# Patient Record
Sex: FEMALE | Race: WHITE | Employment: FULL TIME | URBAN - METROPOLITAN AREA
[De-identification: names, ages, dates, MRNs, and addresses within clinical notes are randomized per-mention and may not be internally consistent; named-entity substitution may affect disease eponyms.]

---

## 2019-11-16 ENCOUNTER — HOSPITAL ENCOUNTER (OUTPATIENT)
Dept: MAMMOGRAPHY | Age: 54
Discharge: HOME | End: 2019-11-16
Payer: COMMERCIAL

## 2019-11-16 DIAGNOSIS — Z12.31 SCREENING MAMMOGRAM FOR HIGH-RISK PATIENT: ICD-10-CM

## 2019-11-16 PROCEDURE — 77067 SCR MAMMO BI INCL CAD: CPT

## 2021-08-19 ENCOUNTER — TRANSCRIBE ORDER (OUTPATIENT)
Dept: SCHEDULING | Age: 56
End: 2021-08-19

## 2021-08-19 DIAGNOSIS — Z12.31 SCREENING MAMMOGRAM FOR HIGH-RISK PATIENT: Primary | ICD-10-CM

## 2021-09-04 ENCOUNTER — HOSPITAL ENCOUNTER (OUTPATIENT)
Dept: MAMMOGRAPHY | Age: 56
Discharge: HOME | End: 2021-09-04
Payer: COMMERCIAL

## 2021-09-04 DIAGNOSIS — Z12.31 SCREENING MAMMOGRAM FOR HIGH-RISK PATIENT: ICD-10-CM

## 2021-09-04 PROCEDURE — 77067 SCR MAMMO BI INCL CAD: CPT

## 2022-11-09 ENCOUNTER — OFFICE VISIT (OUTPATIENT)
Dept: ORTHOPEDIC SURGERY | Age: 57
End: 2022-11-09
Payer: COMMERCIAL

## 2022-11-09 VITALS — WEIGHT: 225.8 LBS | HEIGHT: 64 IN | BODY MASS INDEX: 38.55 KG/M2

## 2022-11-09 DIAGNOSIS — Z96.651 PRESENCE OF TOTAL KNEE JOINT PROSTHESIS, RIGHT: ICD-10-CM

## 2022-11-09 DIAGNOSIS — M25.561 ACUTE PAIN OF RIGHT KNEE: Primary | ICD-10-CM

## 2022-11-09 DIAGNOSIS — Z96.651 PRESENCE OF TOTAL KNEE JOINT PROSTHESIS, RIGHT: Primary | ICD-10-CM

## 2022-11-09 DIAGNOSIS — Z09 SURGERY FOLLOW-UP: ICD-10-CM

## 2022-11-09 PROCEDURE — 99204 OFFICE O/P NEW MOD 45 MIN: CPT | Performed by: ORTHOPAEDIC SURGERY

## 2022-11-09 RX ORDER — DICLOFENAC SODIUM 75 MG/1
75 TABLET, DELAYED RELEASE ORAL 2 TIMES DAILY
Qty: 60 TABLET | Refills: 0 | Status: SHIPPED | OUTPATIENT
Start: 2022-11-09

## 2022-11-09 NOTE — PROGRESS NOTES
Name: Na Cason  YOB: 1965  Gender: female  MRN: [id number]    CC: Right knee pain status post total knee arthroplasty    HPI: Na Cason is a 59 y.o. female who presents with concerns with her right total knee arthroplasty done in 2015. Her surgery was done with a cemented tricompartmental [employer] attune implant. She has generally had a good outcome from her knee replacement surgery. She states she has had several falls over the years and her most memorable one was several years ago. She has been having some anterior discomfort and has noted some clicking over the past several months. She has not seen to be having weightbearing pain and her symptoms do not seem to accompany activity in a predictable way. She has been taking some occasional over-the-counter anti-inflammatory medications but not on a regular basis. She had been advised that if she starts having some discomfort of this nature to have the knee checked and for that reason she comes in for that purpose today. History was obtained from patient    ROS/Meds/PSH/PMH/FH/SH: I personally reviewed the patients standard intake form. Below are the pertinents    Tobacco:  reports that she has never smoked.  She has never used smokeless tobacco.  Past Medical History:   Diagnosis Date    Arthritis     Cancer ([facility] [address] 2011    basal cell on face    Cellulitis 07/2015    right leg    Diabetes ([facility] [address]     \" borderline \" / checks BS 1-2 times a day; average FBS 90's; no meds    Hypertension     controlled with med    Psoriasis     Psychiatric disorder     \" anxiety attacks occasionally \"     Thyroid disease     hypo, controlled with Levothyroxine      Past Surgical History:   Procedure Laterality Date    BUNIONECTOMY Left 1998    left foot surgery (bunion, heel spur)    BUNIONECTOMY Right [address]    COLONOSCOPY  07/2015    KNEE SURGERY Left 2003    KNEE SURGERY Right 2012 OTHER SURGICAL HISTORY  2011    excision of basal cell on face        Physical Examination:  Physical exam: On examination of the patient's gait there is no obvious limp    On examination while standing there is decreased flexion in the lumbosacral spine without acute list or spasm. While seated ,  the Bilateral hip is examined there is full range of motion without obvious issue . On examination of the  Right knee :ROM is 0 to 125 There is a well-healed midline incision with appropriate range of motion and balance. On examination of the  Left knee :ROM is 0 to 125 There is no obvious effusion. Patient is neurologically intact distally. Skin is intact. Imaging:   Radiographs:    AP standing, flexion lateral, and sunrise view of the right knee was obtained  This demonstrated   a well-positioned total knee arthroplasty as described above. She does have on the lateral view some radiolucency of the anterior flange of the tibia but is not continuous. I do not see any change in position of either implant and there are no significant changes compared to films from 2015 on the AP view. .    Radiographic impression:  Stable  right Knee replacement as described      Assessment:   Stable right total knee arthroplasty with some mild anterior discomfort. She has put on some weight since she was initially and last seen and this may be a factor in some anterior discomfort. I did advise her that her gender and body habitus do put her at risk for tibial loosening. She does have radiolucency on the lateral view but the not appear continuous and she does not clinically appear to have a loose implant. If her symptoms worsen I would recommend she be worked up for that possibility however with a bone scan and a sed rate CRP CBC per our protocol. I would at this time recommend that she try oral anti-inflammatory medication and she was given a prescription for Voltaren.   I will also see her back in about 6 months for an x-ray to make sure that I see no significant interval change. Plan:       Follow up:   Return in about 6 months (around 5/9/2023).      Jesus Taylor MD

## 2023-07-27 ENCOUNTER — TRANSCRIBE ORDERS (OUTPATIENT)
Dept: SCHEDULING | Age: 58
End: 2023-07-27

## 2023-07-27 DIAGNOSIS — Z12.31 SCREENING MAMMOGRAM FOR HIGH-RISK PATIENT: Primary | ICD-10-CM

## 2023-07-29 ENCOUNTER — HOSPITAL ENCOUNTER (OUTPATIENT)
Dept: MAMMOGRAPHY | Age: 58
End: 2023-07-29
Payer: COMMERCIAL

## 2023-07-29 DIAGNOSIS — Z12.31 SCREENING MAMMOGRAM FOR HIGH-RISK PATIENT: ICD-10-CM

## 2023-07-29 PROCEDURE — 77067 SCR MAMMO BI INCL CAD: CPT

## 2023-09-26 ENCOUNTER — HOSPITAL ENCOUNTER (OUTPATIENT)
Dept: MAMMOGRAPHY | Age: 58
Discharge: HOME | End: 2023-09-29
Payer: COMMERCIAL

## 2023-09-26 DIAGNOSIS — Z13.820 SCREENING FOR OSTEOPOROSIS: ICD-10-CM

## 2023-09-26 PROCEDURE — 77080 DXA BONE DENSITY AXIAL: CPT

## 2024-12-06 ENCOUNTER — HOSPITAL ENCOUNTER (OUTPATIENT)
Dept: ULTRASOUND IMAGING | Age: 59
Discharge: HOME | End: 2024-12-09
Payer: COMMERCIAL

## 2024-12-06 DIAGNOSIS — R79.89 HYPOURICEMIA: ICD-10-CM

## 2024-12-06 PROCEDURE — 76705 ECHO EXAM OF ABDOMEN: CPT

## 2025-02-12 ENCOUNTER — TRANSCRIBE ORDERS (OUTPATIENT)
Dept: SCHEDULING | Age: 60
End: 2025-02-12

## 2025-02-12 DIAGNOSIS — E78.5 HYPERLIPIDEMIA, UNSPECIFIED HYPERLIPIDEMIA TYPE: Primary | ICD-10-CM

## 2025-02-28 ENCOUNTER — HOSPITAL ENCOUNTER (OUTPATIENT)
Dept: CT IMAGING | Age: 60
Discharge: HOME | End: 2025-02-28

## 2025-02-28 DIAGNOSIS — E78.5 HYPERLIPIDEMIA, UNSPECIFIED HYPERLIPIDEMIA TYPE: ICD-10-CM

## 2025-02-28 PROCEDURE — 75571 CT HRT W/O DYE W/CA TEST: CPT
